# Patient Record
Sex: FEMALE | Race: WHITE | NOT HISPANIC OR LATINO | Employment: OTHER
[De-identification: names, ages, dates, MRNs, and addresses within clinical notes are randomized per-mention and may not be internally consistent; named-entity substitution may affect disease eponyms.]

---

## 2020-11-18 ENCOUNTER — TRANSCRIBE ORDERS (OUTPATIENT)
Dept: OTHER | Age: 65
End: 2020-11-18

## 2020-11-18 DIAGNOSIS — H93.19 TINNITUS, UNSPECIFIED LATERALITY: ICD-10-CM

## 2020-11-18 DIAGNOSIS — Z87.898 HISTORY OF DIZZINESS: Primary | ICD-10-CM

## 2020-12-08 ENCOUNTER — VIRTUAL VISIT (OUTPATIENT)
Dept: OTOLARYNGOLOGY | Facility: CLINIC | Age: 65
End: 2020-12-08
Attending: PSYCHIATRY & NEUROLOGY
Payer: MEDICARE

## 2020-12-08 DIAGNOSIS — H81.12 BENIGN PAROXYSMAL POSITIONAL VERTIGO, LEFT: Primary | ICD-10-CM

## 2020-12-08 PROCEDURE — 99442 PR PHYSICIAN TELEPHONE EVALUATION 11-20 MIN: CPT | Mod: 95 | Performed by: OTOLARYNGOLOGY

## 2020-12-08 RX ORDER — PSEUDOEPHED/ACETAMINOPH/DIPHEN 30MG-500MG
TABLET ORAL
COMMUNITY
Start: 2020-09-03

## 2020-12-08 RX ORDER — VALACYCLOVIR HYDROCHLORIDE 1 G/1
2 TABLET, FILM COATED ORAL
COMMUNITY

## 2020-12-08 RX ORDER — KETOCONAZOLE 20 MG/ML
SHAMPOO TOPICAL
COMMUNITY
Start: 2020-09-18

## 2020-12-08 RX ORDER — PREGABALIN 100 MG/1
100 CAPSULE ORAL 3 TIMES DAILY
COMMUNITY
Start: 2020-11-30

## 2020-12-08 RX ORDER — ALENDRONATE SODIUM 70 MG/1
TABLET ORAL
COMMUNITY
Start: 2020-11-04

## 2020-12-08 RX ORDER — HYDROXYZINE HYDROCHLORIDE 25 MG/1
25-50 TABLET, FILM COATED ORAL
COMMUNITY
Start: 2020-12-03 | End: 2021-12-03

## 2020-12-08 RX ORDER — CYCLOSPORINE 0.5 MG/ML
EMULSION OPHTHALMIC
COMMUNITY
Start: 2020-04-10

## 2020-12-08 RX ORDER — ASPIRIN 81 MG/1
81 TABLET, CHEWABLE ORAL
COMMUNITY
End: 2023-05-08

## 2020-12-08 RX ORDER — TRIAMCINOLONE ACETONIDE 1 MG/G
OINTMENT TOPICAL
COMMUNITY
Start: 2020-09-18 | End: 2023-05-08

## 2020-12-08 RX ORDER — METHOCARBAMOL 750 MG/1
TABLET, FILM COATED ORAL
COMMUNITY
Start: 2020-11-25 | End: 2023-05-08

## 2020-12-08 ASSESSMENT — ENCOUNTER SYMPTOMS
CONSTITUTIONAL NEGATIVE: 1
HEARTBURN: 0
SPUTUM PRODUCTION: 0
COUGH: 0
TINGLING: 0
NAUSEA: 0
VOMITING: 0
DIZZINESS: 1
HEMOPTYSIS: 0
DOUBLE VISION: 0
HEADACHES: 1
BRUISES/BLEEDS EASILY: 0
BLURRED VISION: 0
TREMORS: 0
PHOTOPHOBIA: 0

## 2020-12-08 NOTE — PROGRESS NOTES
"Grace Noble is a 65 year old female who is being evaluated via a billable telephone visit.      The patient has been notified of following:     \"This telephone visit will be conducted via a call between you and your physician/provider. We have found that certain health care needs can be provided without the need for a physical exam.  This service lets us provide the care you need with a short phone conversation.  If a prescription is necessary we can send it directly to your pharmacy.  If lab work is needed we can place an order for that and you can then stop by our lab to have the test done at a later time.    Telephone visits are billed at different rates depending on your insurance coverage. During this emergency period, for some insurers they may be billed the same as an in-person visit.  Please reach out to your insurance provider with any questions.    If during the course of the call the physician/provider feels a telephone visit is not appropriate, you will not be charged for this service.\"    Patient has given verbal consent for Telephone visit?  Yes    What phone number would you like to be contacted at? [phone]    Phone call duration: 20 minutes    Caity Argueta MD      "

## 2020-12-08 NOTE — NURSING NOTE
Grace Noble's goals for this visit include:   Chief Complaint   Patient presents with     Consult     dizziness, ringing in ears     She requests these members of her care team be copied on today's visit information:     PCP: Anil Brown    Referring Provider:  Camila Vela MD  [facility]  [address] [place] [address]    There were no vitals taken for this visit.    Do you need any medication refills at today's visit? Cande Vazquez LPN

## 2020-12-08 NOTE — PROGRESS NOTES
HPI    She has been having dizzy spells for the past a couple of years. It happens when she lays down. Feels off-balanced when Leaning  To left or when she walks from time to time. States tinnitus bilaterally. Denies any otalgia, otorrhea, aural pressure, or nasal congestion. She has a hx of ischemic stroke in her left side: described numbness, weakness several years ago. And MRI showed a stroke. Hip replacement surgery several months ago.  Migraine hx is positive in her history. She is on aspirin .       Review of Systems   Constitutional: Negative.    HENT: Positive for tinnitus. Negative for hearing loss.    Eyes: Negative for blurred vision, double vision and photophobia.   Respiratory: Negative for cough, hemoptysis and sputum production.    Gastrointestinal: Negative for heartburn, nausea and vomiting.   Neurological: Positive for dizziness and headaches. Negative for tingling and tremors.   Endo/Heme/Allergies: Negative for environmental allergies. Does not bruise/bleed easily.         Physical Exam    This was a telephone visit.    A/P  This pleasant patient is having a positional dizzy spells likely due to BPPV and left off-balance that might be due to her left ischemic stroke. I will check her hearing first and schedule her for a [place]. Her questions were answered.

## 2020-12-09 ENCOUNTER — OFFICE VISIT (OUTPATIENT)
Dept: AUDIOLOGY | Facility: CLINIC | Age: 65
End: 2020-12-09
Payer: MEDICARE

## 2020-12-09 DIAGNOSIS — H90.3 SENSORINEURAL HEARING LOSS (SNHL) OF BOTH EARS: Primary | ICD-10-CM

## 2020-12-09 DIAGNOSIS — H81.12 BENIGN PAROXYSMAL POSITIONAL VERTIGO, LEFT: ICD-10-CM

## 2020-12-09 PROCEDURE — 92557 COMPREHENSIVE HEARING TEST: CPT | Performed by: AUDIOLOGIST

## 2020-12-09 PROCEDURE — 92550 TYMPANOMETRY & REFLEX THRESH: CPT | Performed by: AUDIOLOGIST

## 2023-03-21 ENCOUNTER — MEDICAL CORRESPONDENCE (OUTPATIENT)
Dept: HEALTH INFORMATION MANAGEMENT | Facility: CLINIC | Age: 68
End: 2023-03-21
Payer: MEDICARE

## 2023-03-24 ENCOUNTER — TRANSCRIBE ORDERS (OUTPATIENT)
Dept: OTHER | Age: 68
End: 2023-03-24

## 2023-03-24 DIAGNOSIS — R90.89 ABNORMAL FINDING ON MRI OF BRAIN: ICD-10-CM

## 2023-03-24 DIAGNOSIS — R20.2 PARESTHESIAS: Primary | ICD-10-CM

## 2023-03-28 NOTE — TELEPHONE ENCOUNTER
Action 3/28/23 10:37am    Action Taken Attempted to call pt to ask where their most recent MRI was done. No answer, left VM. -TERRANCE    3/29/23 8:51am Pt left VM after hours, confirmed that the most recent MRI's were done at [facility] in [facility]. -JA     Records Requested     March 28, 2023 10:41 AM   [address] [place] [address]  [facility]   Outcome 10:44am Sent request for imaging to be pushed to PACS. -JA  3/29/23 8:55am Images have been resolved into PACS. -JA     Action 4/3/23 1:25pm   Action Taken Pt left another VM regarding most recent MRI's. I returned call and confirmed that I had received previous VM. -JA     Records Requested     May 15, 2023 1:37 PM   [address] [place], [place] [address]   Outcome 1:35pm Sent another imaging request for MRI head/brain scans from 2023, 2018, & 2014. -JA       RECORDS RECEIVED FROM: Care Everywhere   REASON FOR VISIT: Paresthesias, Abnormal finding on MRI of brain   Date of Appt: 5/8/23    NOTES (FOR ALL VISITS) STATUS DETAILS   OFFICE NOTE from referring provider Care Everywhere 3/20/23, 9/13/22, 5/25/21 Camila Vela MBBS @ [facility]    OFFICE NOTE from other specialist Care Everywhere 2/3/23 Deepali Monk MD @ [facility]    12/19/22, 6/21/21, 5/7/21 Juli Quinones DO @ [facility]    9/12/22, 4/12/22 Sahara Hamilton APRN,ELLEN @ [facility]    5/23/22 Sharmila Fontaine MD @ [facility] (Tick bite)   OPERATIVE REPORT Care Everywhere 2/28/23 [facility]k [facility]   MEDICATION LIST Care Everywhere    IMAGING  (FOR ALL VISITS)     MRI (HEAD, NECK, SPINE) PACS [facility]  4/4/23 MR Head  12/15/22 MRI Lumbar Spine  2/8/22 MRA Carotids Neck  6/14/21 MRI Head

## 2023-05-08 ENCOUNTER — LAB (OUTPATIENT)
Dept: LAB | Facility: CLINIC | Age: 68
End: 2023-05-08
Payer: MEDICARE

## 2023-05-08 ENCOUNTER — PRE VISIT (OUTPATIENT)
Dept: NEUROLOGY | Facility: CLINIC | Age: 68
End: 2023-05-08

## 2023-05-08 ENCOUNTER — OFFICE VISIT (OUTPATIENT)
Dept: NEUROLOGY | Facility: CLINIC | Age: 68
End: 2023-05-08
Attending: PSYCHIATRY & NEUROLOGY
Payer: MEDICARE

## 2023-05-08 VITALS — DIASTOLIC BLOOD PRESSURE: 77 MMHG | OXYGEN SATURATION: 96 % | HEART RATE: 82 BPM | SYSTOLIC BLOOD PRESSURE: 132 MMHG

## 2023-05-08 DIAGNOSIS — R90.89 ABNORMAL BRAIN MRI: Primary | ICD-10-CM

## 2023-05-08 DIAGNOSIS — R20.0 NUMBNESS AND TINGLING OF LEFT LEG: ICD-10-CM

## 2023-05-08 DIAGNOSIS — R20.0 NUMBNESS AND TINGLING IN LEFT ARM: ICD-10-CM

## 2023-05-08 DIAGNOSIS — R20.2 NUMBNESS AND TINGLING OF LEFT LEG: ICD-10-CM

## 2023-05-08 DIAGNOSIS — R20.2 NUMBNESS AND TINGLING IN LEFT ARM: ICD-10-CM

## 2023-05-08 DIAGNOSIS — R90.89 ABNORMAL BRAIN MRI: ICD-10-CM

## 2023-05-08 LAB
Lab: NORMAL
PERFORMING LABORATORY: NORMAL
TEST NAME: NORMAL

## 2023-05-08 PROCEDURE — 86255 FLUORESCENT ANTIBODY SCREEN: CPT | Performed by: PATHOLOGY

## 2023-05-08 PROCEDURE — 99205 OFFICE O/P NEW HI 60 MIN: CPT | Performed by: INTERNAL MEDICINE

## 2023-05-08 PROCEDURE — 36415 COLL VENOUS BLD VENIPUNCTURE: CPT | Performed by: PATHOLOGY

## 2023-05-08 RX ORDER — ASCORBIC ACID 500 MG
500 TABLET ORAL DAILY
COMMUNITY

## 2023-05-08 RX ORDER — METHOCARBAMOL 500 MG/1
TABLET, FILM COATED ORAL
COMMUNITY
Start: 2022-12-06

## 2023-05-08 RX ORDER — MELATONIN 5 MG
5 TABLET,CHEWABLE ORAL PRN
COMMUNITY

## 2023-05-08 NOTE — PATIENT INSTRUCTIONS
We are checking for 2 diseases which are cousins of MS; one is called NMO and the other one is call anti-MOG        Ask your pain clinic doctor to see if you can increase the Lyrica to 150 mg three times daily      Ask your primary care provider to recheck your cholesterol next month. In your case we want to the bad cholesterol (LDL) to be less than 70

## 2023-05-08 NOTE — LETTER
"5/8/2023       RE: Grace Noble  [address] [place] [address]       Dear Colleague,    Thank you for referring your patient, Grace Noble, to the [facility] [place] [facility]. Please see a copy of my visit note below.    [facility] Neurology Consultation    Grace Noble MRN# [id number]   Age: 67 year old YOB: 1955     Requesting physician: Anil Pardo     Reason for Consultation: abnormal MRI brain and tingling      History of Presenting Symptoms:   Grace Noble is a 67 year old female who presents today for evaluation of abnormal MRI brain and tingling.     She thinks symptoms gradually developed around 2015. She thinks that symptoms may have gotten slowly worse with time. She feels a tingling sensation in the left arm and leg that is constant, but varies in intensity. She denies any symptoms on the right side. Sometimes it is in the face in cheek and jaw on the left side, but not the right. Her left side also feels a little weaker.     She takes Lyrica 100 mg TID and it is helpful. She hasn't tried higher dosages. She tried Cymbalta twice and had side effects. She thinks she maybe tried gabapentin many years ago for her neck and she gained weight. She tried amitriptyline and she had night terrors.     She notices some problems with word finding difficulties at time. She thinks her memory of recent events is fine. Family hasn't mentioned any concerns about memory.     She has had 2 cervical surgeries. The last surgery predated the onset of numbness.     Her balance is usually pretty good, but sometimes she feels on the \"edge of feeling dizzy\". About 3 years ago she fell due to wet steps and had a leg fracture.     She once had a episode of double vision lasting 10 minutes. She thinks when she looked out of one eye she would still see double. She thinks this was maybe 6 years ago. Sometime " after she had a PFO closed.     She used to get migraines. Now she might get visual aura.       Past Medical History:   There is no problem list on file for this patient.    History reviewed. No pertinent past medical history.     Past Surgical History:     Past Surgical History:   Procedure Laterality Date    BACK SURGERY      cervical surgery X 2 / last surgery around 2016        Social History:     Social History     Tobacco Use    Smoking status: Former     Types: Cigarettes     Quit date: 2013     Years since quitting: 10.3    Smokeless tobacco: Never   Substance Use Topics    Alcohol use: Not Currently    Drug use: Not Currently        Family History:   History reviewed. No pertinent family history.     Medications:     Current Outpatient Medications   Medication Sig    ACETAMINOPHEN EXTRA STRENGTH 500 MG tablet TAKE TWO TABLETS BY MOUTH EVERY 8 HOURS AS NEEDED FOR PAIN    alendronate (FOSAMAX) 70 MG tablet TAKE 1 TABLET BY MOUTH ONCE A WEEK    Calcium-Magnesium-Zinc 333-133-5 MG TABS per tablet Take 1 tablet by mouth daily    cholecalciferol (VITAMIN D3) 25 mcg (1000 units) capsule Take 1,000 Units by mouth daily    HEMP OIL OR EXTRACT OR OTHER CBD CANNABINOID, NOT MEDICAL CANNABIS, Apply topically as needed    ketoconazole (NIZORAL) 2 % external shampoo APPLY TO AFFECTED AREA(S) - LEAVE ON FOR 5 MINUTES, THEN RINSE. USE 3 TIMES A WEEK FOR FLARES AND WEEKLY FOR MAINTENANCE    Melatonin 5 MG CHEW Take 5 mg by mouth as needed    methocarbamol (ROBAXIN) 500 MG tablet TAKE ONE TO TWO TABLETS BY MOUTH THREE TIMES A DAY AS NEEDED    pregabalin (LYRICA) 100 MG capsule Take 100 mg by mouth 3 times daily    RESTASIS 0.05 % ophthalmic emulsion INSTILL ONE DROP IN IN EACH EYE TWO TIMES A DAY    valACYclovir (VALTREX) 1000 mg tablet Take 2 g by mouth    vitamin B-12 (CYANOCOBALAMIN) 1000 MCG tablet Take 1,000 mcg by mouth daily    vitamin C (ASCORBIC ACID) 500 MG tablet Take 500 mg by mouth daily     No current  facility-administered medications for this visit.        Allergies:     Allergies   Allergen Reactions    Adhesive Tape Other (See Comments)     Other reaction(s): skin rash hives  Blisters; medipore tape per pt description.(white adhesive)    Codeine Itching    Amitriptyline Other (See Comments)     Night terrors    No Clinical Screening - See Comments Other (See Comments)     Nail polish hardener - nails seperated 10/2019 - Grace has pictures on her phone     Tramadol Itching and Palpitations     Other reaction(s): heart racing itching        Review of Systems:   As above     Physical Exam:   Vitals: /77   Pulse 82   SpO2 96%    General: Seated comfortably in no acute distress.  HEENT: Optic discs sharp on funduscopic exam.   Lungs: breathing comfortably  Neurologic:     Mental Status: Fully alert, attentive. Normal memory and fund of knowledge. Language normal, speech clear and fluent, no paraphasic errors.      Cranial Nerves: Visual fields intact. PERRL. EOMI with normal smooth pursuit. Facial sensation intact/symmetric. Facial movements symmetric. Hearing not formally tested but intact to conversation. Palate elevation symmetric, uvula midline. No dysarthria. Shoulder shrug strong bilaterally. Tongue protrusion midline.     Motor: No tremors or other abnormal movements observed. Muscle tone normal throughout. No pronator drift. Normal/symmetric rapid finger tapping. Strength 5/5 throughout right upper and lower extremities. On the left upper and lower extremities has occasional give way throughout, but appears to briefly be able to get to 5/5 strength.      Deep Tendon Reflexes: 2+/symmetric throughout upper extremities, trace patella and ankle jerks. No clonus. Toes downgoing bilaterally.     Sensory: Decreased sensation to light touch in the entire left upper and lower extremity when compared to the right. Cold sensation is more prominent in the left upper and lower extremity compared to right.  Proprioception is intact throughout. Pinprick is mildly decreased in areas of left upper extremity, otherwise generally intact. Vibration is normal in the hands, ~6 seconds in the left great toe and ~12 seconds in the right great toe. Negative Romberg.      Coordination: Finger-nose-finger and heel-shin intact without dysmetria.      Gait: Normal, steady casual gait. Able to walk on toes, heels and tandem without significant difficulty.         Data: Pertinent prior to visit   Imaging:  MRI brain 4/2023  IMPRESSION:   1. Stable brain MRI, no new acute intracranial abnormality.    MRI lumbar 12/2022  IMPRESSION:   1. Multilevel degenerative disc and facet changes with no high-grade neural   foraminal narrowing or central canal stenosis at any level.   2. No focal disc protrusion or extrusion is seen at any level.    MRA neck 2/2022  IMPRESSION:   No evidence for carotid or vertebral artery dissection or hemodynamically   substantial stenosis, allowing for normal congenital variant anatomy and some   artifact as detailed.    MRI head 6/2021  IMPRESSION:   1. Stable appearing FLAIR hyperintense structures within the periventricular   white matter concerning for demyelinating process given the orientation to the   lateral ventricles.  No evidence of active demyelination.  Attention on   follow-up.   2. No mass, hemorrhage, or evidence of acute ischemia.   3. No suspicious areas of enhancement.   4. Chronic appearing right parietal and right cerebellar infarcts.      MRA head 1/2021  IMPRESSION:   No hemodynamically significant intracranial stenosis.    CHRISTINE 2019  CONCLUSION:   1. Normal left ventricular systolic function.  Ejection fraction is 60%.   2. No significant valvular heart disease.   3. Patent foramen ovale demonstrated by two-dimensional imaging, color flow imaging and an agitated contrast bubble study.     TTE 2019  Summary:     * Prior study from 12/26/2018.     * There is mild concentric left ventricular  hypertrophy.     * Left ventricular systolic function is normal.     * The estimated ejection fraction is 60-65%.     * Left ventricular segmental wall motion is normal.     * Atrial septal closure device well visualized.     * No evidence of shunting across the Amplatzer septal occluder placement   site.     * No significant valvular abnormalities seen.     * Compared to prior study, there is no significant change.     MRI brain 2018  IMPRESSION:   1. No evidence for an acute intracranial abnormality.  No acute intracranial   hemorrhage or acute infarct.  No intracranial mass.   2. Redemonstration of nonenhancing T2 FLAIR hyperintensities within the deep   cerebral white matter.  There are few new T2 FLAIR hyperintensities present,   but none are enhancing.  This is seen in the setting of new small chronic   infarcts of the right frontal and parietal lobes and tiny new infarcts within   the right cerebellum.  Differential considerations with the T2 FLAIR   hyperintensities would include chronic microangiopathic changes or potentially   an underlying demyelinating process such as multiple sclerosis.  No evidence to   suggest active demyelination if this is a demyelinating process.    MRI cervical 2018  IMPRESSION:   Increased focal central disc bulging C7-T1 without spinal canal or significant   neural foraminal compromise.  No significant change in C4-7 ACDF    MRI thoracic 2018  IMPRESSION:   Mild-to-moderate degenerative changes.  Mild multilevel disc bulging without   significant disc protrusion, spinal canal or neural foraminal compromise or   focal cord abnormality.    MRI brain 2014  CONCLUSION:     1. Nonspecific white matter hyperintensities in both hemispheres, with configuration raising   suspicion for demyelinating disease.  There is no abnormal enhancement to suggest acute   demyelination at this time.  Accelerated chronic microvascular ischemic disease, signal changes   related to migraine headaches or  previous trauma are also differential considerations.   2. No acute infarct, intracranial hemorrhage or evidence of mass.     3. Moderate right mastoid effusion.       Laboratory:  A1c normal (2/2023)    CSF results from 2018  Oligoclonal bands negative.  IgG index negative.  VDRL test negative.  Spinal tap-cell count, protein and glucose are normal.    GAD65 Ab 0.05, [place] paraneoplastic panel negative, ELP without M protein, B12 385 (2021)    Beta-2-glycoprotein Ab and Lupus AC screen negative, Lyme Abs normal (2021)    EKG - normal sinus rhythm (4/2023)         Assessment and Plan:   Assessment:  Grace Noble is a 67 year old female who presents today for evaluation of abnormal MRI brain and left sided tingling. MRI brain previously has shown several chronic infarctions and multiple non specific T2 hyper intensities. She previously had CSF analysis, which was negative for oligoclonal bands. She reports some worsening in left sided tingling over the last few years. Thankfully MRI brain was recently stable last month at [facility]. We will attempt to get imaging from last month and 2014/2018 to evaluate for any concerning changes over time. NMO/MOG antibodies were sent today to evaluate for alternative demyelinating diseases. Patient appears to have several lacunar infarctions, which are most likely related to small vessel atherosclerosis. The larger right parietal lesion may be an embolic infarction and may also account for left sided tingling. She had PFO closure as one possible etiology of stroke. We will investigate whether she has previously had cardiac monitoring done and if not I would recommend a 30 day holter monitor to evaluate for atrial fibrillation.      Plan:  - [place] CDS1 panel  - Aspirin 81 mg daily   - Recheck LDL with primary; goal < 70  - Obtain previous MRI brain scans for comparison    Follow up in Neurology clinic pending above        The total time of this encounter today amounted to 68  minutes. This time included time spent with the patient, prep work, ordering tests, and performing post visit documentation.        Again, thank you for allowing me to participate in the care of your patient.      Sincerely,    Mauricio Velazquez MD

## 2023-05-08 NOTE — PROGRESS NOTES
"[facility] Neurology Consultation    Grace Noble MRN# [id number]   Age: 67 year old YOB: 1955     Requesting physician: Anil Pardo     Reason for Consultation: abnormal MRI brain and tingling      History of Presenting Symptoms:   Grace Noble is a 67 year old female who presents today for evaluation of abnormal MRI brain and tingling.     She thinks symptoms gradually developed around 2015. She thinks that symptoms may have gotten slowly worse with time. She feels a tingling sensation in the left arm and leg that is constant, but varies in intensity. She denies any symptoms on the right side. Sometimes it is in the face in cheek and jaw on the left side, but not the right. Her left side also feels a little weaker.     She takes Lyrica 100 mg TID and it is helpful. She hasn't tried higher dosages. She tried Cymbalta twice and had side effects. She thinks she maybe tried gabapentin many years ago for her neck and she gained weight. She tried amitriptyline and she had night terrors.     She notices some problems with word finding difficulties at time. She thinks her memory of recent events is fine. Family hasn't mentioned any concerns about memory.     She has had 2 cervical surgeries. The last surgery predated the onset of numbness.     Her balance is usually pretty good, but sometimes she feels on the \"edge of feeling dizzy\". About 3 years ago she fell due to wet steps and had a leg fracture.     She once had a episode of double vision lasting 10 minutes. She thinks when she looked out of one eye she would still see double. She thinks this was maybe 6 years ago. Sometime after she had a PFO closed.     She used to get migraines. Now she might get visual aura.       Past Medical History:   There is no problem list on file for this patient.    History reviewed. No pertinent past medical history.     Past Surgical History:     Past Surgical History:   Procedure Laterality Date     " BACK SURGERY      cervical surgery X 2 / last surgery around 2016        Social History:     Social History     Tobacco Use     Smoking status: Former     Types: Cigarettes     Quit date: 2013     Years since quitting: 10.3     Smokeless tobacco: Never   Substance Use Topics     Alcohol use: Not Currently     Drug use: Not Currently        Family History:   History reviewed. No pertinent family history.     Medications:     Current Outpatient Medications   Medication Sig     ACETAMINOPHEN EXTRA STRENGTH 500 MG tablet TAKE TWO TABLETS BY MOUTH EVERY 8 HOURS AS NEEDED FOR PAIN     alendronate (FOSAMAX) 70 MG tablet TAKE 1 TABLET BY MOUTH ONCE A WEEK     Calcium-Magnesium-Zinc 333-133-5 MG TABS per tablet Take 1 tablet by mouth daily     cholecalciferol (VITAMIN D3) 25 mcg (1000 units) capsule Take 1,000 Units by mouth daily     HEMP OIL OR EXTRACT OR OTHER CBD CANNABINOID, NOT MEDICAL CANNABIS, Apply topically as needed     ketoconazole (NIZORAL) 2 % external shampoo APPLY TO AFFECTED AREA(S) - LEAVE ON FOR 5 MINUTES, THEN RINSE. USE 3 TIMES A WEEK FOR FLARES AND WEEKLY FOR MAINTENANCE     Melatonin 5 MG CHEW Take 5 mg by mouth as needed     methocarbamol (ROBAXIN) 500 MG tablet TAKE ONE TO TWO TABLETS BY MOUTH THREE TIMES A DAY AS NEEDED     pregabalin (LYRICA) 100 MG capsule Take 100 mg by mouth 3 times daily     RESTASIS 0.05 % ophthalmic emulsion INSTILL ONE DROP IN IN EACH EYE TWO TIMES A DAY     valACYclovir (VALTREX) 1000 mg tablet Take 2 g by mouth     vitamin B-12 (CYANOCOBALAMIN) 1000 MCG tablet Take 1,000 mcg by mouth daily     vitamin C (ASCORBIC ACID) 500 MG tablet Take 500 mg by mouth daily     No current facility-administered medications for this visit.        Allergies:     Allergies   Allergen Reactions     Adhesive Tape Other (See Comments)     Other reaction(s): skin rash hives  Blisters; medipore tape per pt description.(white adhesive)     Codeine Itching     Amitriptyline Other (See Comments)      Night terrors     No Clinical Screening - See Comments Other (See Comments)     Nail polish hardener - nails seperated 10/2019 - Grace has pictures on her phone      Tramadol Itching and Palpitations     Other reaction(s): heart racing itching        Review of Systems:   As above     Physical Exam:   Vitals: /77   Pulse 82   SpO2 96%    General: Seated comfortably in no acute distress.  HEENT: Optic discs sharp on funduscopic exam.   Lungs: breathing comfortably  Neurologic:     Mental Status: Fully alert, attentive. Normal memory and fund of knowledge. Language normal, speech clear and fluent, no paraphasic errors.      Cranial Nerves: Visual fields intact. PERRL. EOMI with normal smooth pursuit. Facial sensation intact/symmetric. Facial movements symmetric. Hearing not formally tested but intact to conversation. Palate elevation symmetric, uvula midline. No dysarthria. Shoulder shrug strong bilaterally. Tongue protrusion midline.     Motor: No tremors or other abnormal movements observed. Muscle tone normal throughout. No pronator drift. Normal/symmetric rapid finger tapping. Strength 5/5 throughout right upper and lower extremities. On the left upper and lower extremities has occasional give way throughout, but appears to briefly be able to get to 5/5 strength.      Deep Tendon Reflexes: 2+/symmetric throughout upper extremities, trace patella and ankle jerks. No clonus. Toes downgoing bilaterally.     Sensory: Decreased sensation to light touch in the entire left upper and lower extremity when compared to the right. Cold sensation is more prominent in the left upper and lower extremity compared to right. Proprioception is intact throughout. Pinprick is mildly decreased in areas of left upper extremity, otherwise generally intact. Vibration is normal in the hands, ~6 seconds in the left great toe and ~12 seconds in the right great toe. Negative Romberg.      Coordination: Finger-nose-finger and heel-shin  intact without dysmetria.      Gait: Normal, steady casual gait. Able to walk on toes, heels and tandem without significant difficulty.         Data: Pertinent prior to visit   Imaging:  MRI brain 4/2023  IMPRESSION:   1. Stable brain MRI, no new acute intracranial abnormality.    MRI lumbar 12/2022  IMPRESSION:   1. Multilevel degenerative disc and facet changes with no high-grade neural   foraminal narrowing or central canal stenosis at any level.   2. No focal disc protrusion or extrusion is seen at any level.    MRA neck 2/2022  IMPRESSION:   No evidence for carotid or vertebral artery dissection or hemodynamically   substantial stenosis, allowing for normal congenital variant anatomy and some   artifact as detailed.    MRI head 6/2021  IMPRESSION:   1. Stable appearing FLAIR hyperintense structures within the periventricular   white matter concerning for demyelinating process given the orientation to the   lateral ventricles.  No evidence of active demyelination.  Attention on   follow-up.   2. No mass, hemorrhage, or evidence of acute ischemia.   3. No suspicious areas of enhancement.   4. Chronic appearing right parietal and right cerebellar infarcts.      MRA head 1/2021  IMPRESSION:   No hemodynamically significant intracranial stenosis.    CHRISTINE 2019  CONCLUSION:   1. Normal left ventricular systolic function.  Ejection fraction is 60%.   2. No significant valvular heart disease.   3. Patent foramen ovale demonstrated by two-dimensional imaging, color flow imaging and an agitated contrast bubble study.     TTE 2019  Summary:     * Prior study from 12/26/2018.     * There is mild concentric left ventricular hypertrophy.     * Left ventricular systolic function is normal.     * The estimated ejection fraction is 60-65%.     * Left ventricular segmental wall motion is normal.     * Atrial septal closure device well visualized.     * No evidence of shunting across the Amplatzer septal occluder placement   site.      * No significant valvular abnormalities seen.     * Compared to prior study, there is no significant change.     MRI brain 2018  IMPRESSION:   1. No evidence for an acute intracranial abnormality.  No acute intracranial   hemorrhage or acute infarct.  No intracranial mass.   2. Redemonstration of nonenhancing T2 FLAIR hyperintensities within the deep   cerebral white matter.  There are few new T2 FLAIR hyperintensities present,   but none are enhancing.  This is seen in the setting of new small chronic   infarcts of the right frontal and parietal lobes and tiny new infarcts within   the right cerebellum.  Differential considerations with the T2 FLAIR   hyperintensities would include chronic microangiopathic changes or potentially   an underlying demyelinating process such as multiple sclerosis.  No evidence to   suggest active demyelination if this is a demyelinating process.    MRI cervical 2018  IMPRESSION:   Increased focal central disc bulging C7-T1 without spinal canal or significant   neural foraminal compromise.  No significant change in C4-7 ACDF    MRI thoracic 2018  IMPRESSION:   Mild-to-moderate degenerative changes.  Mild multilevel disc bulging without   significant disc protrusion, spinal canal or neural foraminal compromise or   focal cord abnormality.    MRI brain 2014  CONCLUSION:     1. Nonspecific white matter hyperintensities in both hemispheres, with configuration raising   suspicion for demyelinating disease.  There is no abnormal enhancement to suggest acute   demyelination at this time.  Accelerated chronic microvascular ischemic disease, signal changes   related to migraine headaches or previous trauma are also differential considerations.   2. No acute infarct, intracranial hemorrhage or evidence of mass.     3. Moderate right mastoid effusion.       Laboratory:  A1c normal (2/2023)    CSF results from 2018  Oligoclonal bands negative.  IgG index negative.  VDRL test negative.  Spinal  tap-cell count, protein and glucose are normal.    GAD65 Ab 0.05, Leavitt paraneoplastic panel negative, ELP without M protein, B12 385 (2021)    Beta-2-glycoprotein Ab and Lupus AC screen negative, Lyme Abs normal (2021)    EKG - normal sinus rhythm (4/2023)         Assessment and Plan:   Assessment:  Grace Noble is a 67 year old female who presents today for evaluation of abnormal MRI brain and left sided tingling. MRI brain previously has shown several chronic infarctions and multiple non specific T2 hyper intensities. She previously had CSF analysis, which was negative for oligoclonal bands. She reports some worsening in left sided tingling over the last few years. Thankfully MRI brain was recently stable last month at [facility]. We will attempt to get imaging from last month and 2014/2018 to evaluate for any concerning changes over time. NMO/MOG antibodies were sent today to evaluate for alternative demyelinating diseases. Patient appears to have several lacunar infarctions, which are most likely related to small vessel atherosclerosis. The larger right parietal lesion may be an embolic infarction and may also account for left sided tingling. She had PFO closure as one possible etiology of stroke. We will investigate whether she has previously had cardiac monitoring done and if not I would recommend a 30 day holter monitor to evaluate for atrial fibrillation.      Plan:  - Vincent CDS1 panel  - Aspirin 81 mg daily   - Recheck LDL with primary; goal < 70  - Obtain previous MRI brain scans for comparison    Follow up in Neurology clinic pending above    Mauricio Velazquez MD   of Neurology  [facility]      The total time of this encounter today amounted to 68 minutes. This time included time spent with the patient, prep work, ordering tests, and performing post visit documentation.

## 2023-05-16 ENCOUNTER — TELEPHONE (OUTPATIENT)
Dept: NEUROLOGY | Facility: CLINIC | Age: 68
End: 2023-05-16
Payer: MEDICARE

## 2023-05-16 NOTE — TELEPHONE ENCOUNTER
Writer spoke with the patient. She is scheduled for the Heart Monitor here in  on 5/18/2023.    Faxed order to [facility] in case the patient is able to have it placed there. She wants to be sure they have the correct equipment that doesn't involve wires and a mobile monitor, as she is very active.    Faxed order to [phone].    Kia DEL CASTILLO/Procedure    [facility]   Neurology, NeuroSurgery, NeuroPsychology and Pain Management Specialties  Medical/Surgical Adult Specialties

## 2023-05-18 ENCOUNTER — ANCILLARY PROCEDURE (OUTPATIENT)
Dept: CARDIOLOGY | Facility: CLINIC | Age: 68
End: 2023-05-18
Attending: INTERNAL MEDICINE
Payer: MEDICARE

## 2023-05-18 DIAGNOSIS — Z86.73 HISTORY OF STROKE: ICD-10-CM

## 2023-05-18 DIAGNOSIS — R90.89 ABNORMAL BRAIN MRI: ICD-10-CM

## 2023-05-18 LAB — MAYO MISC RESULT: NORMAL

## 2023-05-18 PROCEDURE — 93228 REMOTE 30 DAY ECG REV/REPORT: CPT | Performed by: INTERNAL MEDICINE

## 2023-05-18 NOTE — PATIENT INSTRUCTIONS
The patient was educated on the purpose and function of a cardiac event monitor as well as when and where to return the monitor.  After the patient demonstrated an understanding, monitor s/n [id number] was applied to the patient.

## 2023-05-26 ENCOUNTER — TELEPHONE (OUTPATIENT)
Dept: NEUROLOGY | Facility: CLINIC | Age: 68
End: 2023-05-26
Payer: MEDICARE

## 2023-06-19 ENCOUNTER — MEDICAL CORRESPONDENCE (OUTPATIENT)
Dept: HEALTH INFORMATION MANAGEMENT | Facility: CLINIC | Age: 68
End: 2023-06-19
Payer: MEDICARE

## 2023-07-23 ENCOUNTER — HEALTH MAINTENANCE LETTER (OUTPATIENT)
Age: 68
End: 2023-07-23

## 2024-09-15 ENCOUNTER — HEALTH MAINTENANCE LETTER (OUTPATIENT)
Age: 69
End: 2024-09-15

## 2025-06-01 ENCOUNTER — HEALTH MAINTENANCE LETTER (OUTPATIENT)
Age: 70
End: 2025-06-01